# Patient Record
Sex: FEMALE | NOT HISPANIC OR LATINO | URBAN - METROPOLITAN AREA
[De-identification: names, ages, dates, MRNs, and addresses within clinical notes are randomized per-mention and may not be internally consistent; named-entity substitution may affect disease eponyms.]

---

## 2019-08-17 ENCOUNTER — IMPORTED ENCOUNTER (OUTPATIENT)
Dept: URBAN - METROPOLITAN AREA CLINIC 1 | Facility: CLINIC | Age: 74
End: 2019-08-17

## 2019-08-17 PROBLEM — Z79.84: Noted: 2019-08-17

## 2019-08-17 PROBLEM — H25.813: Noted: 2019-08-17

## 2019-08-17 PROBLEM — E11.9: Noted: 2019-08-17

## 2019-08-17 PROCEDURE — 92015 DETERMINE REFRACTIVE STATE: CPT

## 2019-08-17 PROCEDURE — 92004 COMPRE OPH EXAM NEW PT 1/>: CPT

## 2019-08-17 NOTE — PATIENT DISCUSSION
1.  DM Type II (Oral Meds) without sign of diabetic retinopathy and no blot heme on dilated retinal examination today OU No Macular Edema:  Discussed the pathophysiology of diabetes and its effect on the eye and risk of blindness. Stressed the importance of strong glucose control. Advised of importance of at least yearly dilated examinations but to contact us immediately for any problems or concerns. 2. Cataract OU:  Visually significant secondary to glare; however patient would like to get new MRx consider Phaco next year. Letter to PCP MRx given Return for an appointment in 1 yr 30 glare with Dr. Xochitl Lilly.

## 2022-04-02 ASSESSMENT — VISUAL ACUITY
OS_SC: 20/40
OD_SC: 20/40
OS_GLARE: 20/400
OD_GLARE: 20/400

## 2022-04-02 ASSESSMENT — TONOMETRY
OS_IOP_MMHG: 15
OD_IOP_MMHG: 17